# Patient Record
Sex: FEMALE | Race: WHITE | NOT HISPANIC OR LATINO | Employment: FULL TIME | URBAN - METROPOLITAN AREA
[De-identification: names, ages, dates, MRNs, and addresses within clinical notes are randomized per-mention and may not be internally consistent; named-entity substitution may affect disease eponyms.]

---

## 2024-04-29 ENCOUNTER — MEDICAL CORRESPONDENCE (OUTPATIENT)
Dept: HEALTH INFORMATION MANAGEMENT | Facility: CLINIC | Age: 64
End: 2024-04-29
Payer: COMMERCIAL

## 2024-04-30 ENCOUNTER — TRANSCRIBE ORDERS (OUTPATIENT)
Dept: OTHER | Age: 64
End: 2024-04-30

## 2024-04-30 DIAGNOSIS — R13.19 ESOPHAGEAL DYSPHAGIA: Primary | ICD-10-CM

## 2024-10-31 NOTE — TELEPHONE ENCOUNTER
REFERRAL INFORMATION:  Referring Provider:  Fatimah Diaz APRN CNP -Gastroenterology  Referring Clinic:  [facility] clinic at [address] [place] [address]   Reason for Visit/Diagnosis: Dysphagia with suspected developing type 2 achalasia. No response to botox injection      FUTURE VISIT INFORMATION:  Appointment Date: 12/2/24  Appointment Time:      NOTES STATUS DETAILS   OFFICE NOTE from Referring Provider Care Everywhere 4/11/24, 1/29/24   OFFICE NOTE from Other Specialist Care Everywhere 10/16/24, 10/1/24, 9/23/24-Samantha German-SLP-[facility]    8/27/24-Vida Joshi PA-C[facility]    1/23/24-Curry Ball PA-C [facility]    1/7/24-Caterina TROTTER [facility]   HOSPITAL DISCHARGE SUMMARY/  ED VISITS Care Everywhere ED 1/14/24-Dr. Gentile -[place]        ENDOSCOPY  Care Everywhere [facility]:  6/21/24  5/9/24  4/5/24  2/19/24  1/15/24   STOOL TESTING Care Everywhere 8/1/24-Fecal Occult blood   PERTINENT LABS Care Everywhere    PATHOLOGY REPORTS (RELATED) Care Everywhere EGD 1/15/24   IMAGING (CT, MRI, EGD, MRCP, Small Bowel Follow Through/SBT, MR/CT Enterography) In process [facility]:  2/1/24-XR Esophagram  1/14/24-XR chest     Records Requested     October 31, 2024 10:09 AM  [id number]   Facility  [facility]   Outcome Requested images-Received

## 2024-12-02 ENCOUNTER — PRE VISIT (OUTPATIENT)
Dept: GASTROENTEROLOGY | Facility: CLINIC | Age: 64
End: 2024-12-02